# Patient Record
Sex: MALE | Race: WHITE | NOT HISPANIC OR LATINO | URBAN - METROPOLITAN AREA
[De-identification: names, ages, dates, MRNs, and addresses within clinical notes are randomized per-mention and may not be internally consistent; named-entity substitution may affect disease eponyms.]

---

## 2021-10-05 ENCOUNTER — OFFICE VISIT (OUTPATIENT)
Dept: URBAN - METROPOLITAN AREA CLINIC 74 | Facility: CLINIC | Age: 61
End: 2021-10-05
Payer: COMMERCIAL

## 2021-10-05 ENCOUNTER — WEB ENCOUNTER (OUTPATIENT)
Dept: URBAN - METROPOLITAN AREA CLINIC 74 | Facility: CLINIC | Age: 61
End: 2021-10-05

## 2021-10-05 DIAGNOSIS — K76.0 FATTY LIVER: ICD-10-CM

## 2021-10-05 DIAGNOSIS — K80.50 BILIARY COLIC: ICD-10-CM

## 2021-10-05 DIAGNOSIS — Z12.11 COLON CANCER SCREENING: ICD-10-CM

## 2021-10-05 DIAGNOSIS — F10.10 ALCOHOL ABUSE: ICD-10-CM

## 2021-10-05 DIAGNOSIS — R94.5 ABNORMAL LFTS: ICD-10-CM

## 2021-10-05 PROBLEM — 305058001: Status: ACTIVE | Noted: 2021-10-05

## 2021-10-05 PROBLEM — 37389005: Status: ACTIVE | Noted: 2021-10-05

## 2021-10-05 PROCEDURE — 99204 OFFICE O/P NEW MOD 45 MIN: CPT | Performed by: STUDENT IN AN ORGANIZED HEALTH CARE EDUCATION/TRAINING PROGRAM

## 2021-10-05 RX ORDER — EZETIMIBE 10 MG
1 TABLET TABLET ORAL ONCE A DAY
Status: ACTIVE | COMMUNITY

## 2021-10-05 RX ORDER — OMEPRAZOLE 40 MG/1
TAKE 1 CAPSULE (40 MG) BY ORAL ROUTE ONCE DAILY BEFORE A MEAL FOR 30 DAYS CAPSULE, DELAYED RELEASE PELLETS ORAL 1
Qty: 30 | Refills: 2 | Status: DISCONTINUED | COMMUNITY
Start: 2017-11-30

## 2021-10-05 NOTE — HPI-TODAY'S VISIT:
60-year-old male New to me Comes in for evaluation of intermittent episodes of epigastric and right upper quadrant pain. Episodic, moderate to severe in intensity, radiates to back around the site, mostly happens after meal intake.  At one of the episode, patient also reports darker urine and follicular stools for 1 day which then gradually resolved. Currently denies any pain.  No nausea or vomiting.  Does reports reflux but very rare episodes which respond very well to over-the-counter antacids. No dysphagia or odynophagia. Also reports history of abnormal liver function test which seems to be fluctuant for a long period of time.  Reports history of alcohol use which has been an issue although he has been trying to quit but not successfully so far. Has been told that he has fatty liver on his ultrasound. His liver function test improves when he has been abstinent from alcohol but then again worsens.  Last LFT was notable for isolated elevation in AST of upto 40s. Rest were within normal.

## 2021-10-11 ENCOUNTER — TELEPHONE ENCOUNTER (OUTPATIENT)
Dept: URBAN - METROPOLITAN AREA CLINIC 23 | Facility: CLINIC | Age: 61
End: 2021-10-11

## 2021-10-11 LAB
ANTI-CENTROMERE B ANTIBODIES: <0.2
ANTI-DNA (DS) AB QN: 1
ANTI-JO-1: <0.2
ANTI-MITOCHONDRIAL AB BY IFA: (no result)
ANTICHROMATIN ANTIBODIES: <0.2
ANTISCLERODERMA-70 ANTIBODIES: <0.2
FERRITIN, SERUM: 465
HBSAG SCREEN: NEGATIVE
HCV AB: 0.2
HEP B CORE AB, IGM: NEGATIVE
HEP B CORE AB, TOT: NEGATIVE
HEP B SURFACE AB, QUAL: NON REACTIVE
HEP BE AB: NEGATIVE
HEP BE AG: NEGATIVE
IGG, IMMUNOGLOBULIN G (RDL): 644
INTERPRETATION:: (no result)
Lab: (no result)
RNP ANTIBODIES: 0.3
SJOGREN'S ANTI-SS-A: <0.2
SJOGREN'S ANTI-SS-B: <0.2
SMITH ANTIBODIES: <0.2

## 2021-11-24 ENCOUNTER — OFFICE VISIT (OUTPATIENT)
Dept: URBAN - METROPOLITAN AREA CLINIC 74 | Facility: CLINIC | Age: 61
End: 2021-11-24

## 2021-11-24 RX ORDER — EZETIMIBE 10 MG
1 TABLET TABLET ORAL ONCE A DAY
Status: ACTIVE | COMMUNITY

## 2022-03-28 ENCOUNTER — OFFICE VISIT (OUTPATIENT)
Dept: URBAN - METROPOLITAN AREA CLINIC 74 | Facility: CLINIC | Age: 62
End: 2022-03-28
Payer: COMMERCIAL

## 2022-03-28 DIAGNOSIS — K62.5 BLOOD PER RECTUM: ICD-10-CM

## 2022-03-28 DIAGNOSIS — R14.0 BLOATING: ICD-10-CM

## 2022-03-28 DIAGNOSIS — K21.9 GASTROESOPHAGEAL REFLUX DISEASE, UNSPECIFIED WHETHER ESOPHAGITIS PRESENT: ICD-10-CM

## 2022-03-28 DIAGNOSIS — R13.19 ESOPHAGEAL DYSPHAGIA: ICD-10-CM

## 2022-03-28 PROBLEM — 40890009: Status: ACTIVE | Noted: 2022-03-28

## 2022-03-28 PROBLEM — 235595009: Status: ACTIVE | Noted: 2022-03-28

## 2022-03-28 PROCEDURE — 99214 OFFICE O/P EST MOD 30 MIN: CPT | Performed by: STUDENT IN AN ORGANIZED HEALTH CARE EDUCATION/TRAINING PROGRAM

## 2022-03-28 RX ORDER — OMEPRAZOLE 20 MG/1
1 CAPSULE 30 MINUTES BEFORE MORNING MEAL CAPSULE, DELAYED RELEASE ORAL ONCE A DAY
Qty: 30 | Refills: 2 | OUTPATIENT
Start: 2022-03-28

## 2022-03-28 RX ORDER — EZETIMIBE 10 MG
1 TABLET TABLET ORAL ONCE A DAY
Status: ACTIVE | COMMUNITY

## 2022-03-28 NOTE — HPI-TODAY'S VISIT:
61-year-old male Established with me in the past Comes in for multiple complaints Patient stated that he underwent cholecystectomy and since then his abdomen pain has resolved. Patient has also stopped drinking alcohol. Patient states that he has a longstanding history of reflux for which he takes antacids on as-needed basis and also has been intermittently feeling difficulty in swallowing.  This happens sometimes with liquids sometimes with solids but not consistently.  He says that this has been happening for many many years and has not progressed. Patient also reports significant bloating.  Does reports regular bowel movements.  No significant constipation or diarrhea.  Does reports intermittent blood per rectum whenever he strains which is not very common.  The blood was small in amount, red in color, mostly on Adali and separate from stool. Last colonoscopy about 8 years back.  Recommendation was to repeat in 10-year interval for screening.

## 2022-04-01 ENCOUNTER — TELEPHONE ENCOUNTER (OUTPATIENT)
Dept: URBAN - METROPOLITAN AREA CLINIC 74 | Facility: CLINIC | Age: 62
End: 2022-04-01

## 2022-06-20 ENCOUNTER — OFFICE VISIT (OUTPATIENT)
Dept: URBAN - METROPOLITAN AREA CLINIC 80 | Facility: CLINIC | Age: 62
End: 2022-06-20
Payer: COMMERCIAL

## 2022-06-20 ENCOUNTER — DASHBOARD ENCOUNTERS (OUTPATIENT)
Age: 62
End: 2022-06-20

## 2022-06-20 VITALS
TEMPERATURE: 98.1 F | DIASTOLIC BLOOD PRESSURE: 80 MMHG | HEIGHT: 69 IN | HEART RATE: 87 BPM | WEIGHT: 258 LBS | BODY MASS INDEX: 38.21 KG/M2 | SYSTOLIC BLOOD PRESSURE: 150 MMHG

## 2022-06-20 DIAGNOSIS — E66.9 OBESITY, UNSPECIFIED: ICD-10-CM

## 2022-06-20 DIAGNOSIS — K76.0 FATTY LIVER: ICD-10-CM

## 2022-06-20 DIAGNOSIS — Z12.11 SCREENING FOR COLON CANCER: ICD-10-CM

## 2022-06-20 DIAGNOSIS — F10.10 ALCOHOL ABUSE: ICD-10-CM

## 2022-06-20 DIAGNOSIS — Z90.49 HISTORY OF CHOLECYSTECTOMY: ICD-10-CM

## 2022-06-20 PROBLEM — 414916001: Status: ACTIVE | Noted: 2022-06-20

## 2022-06-20 PROBLEM — 428882003: Status: ACTIVE | Noted: 2022-06-20

## 2022-06-20 PROBLEM — 15167005: Status: ACTIVE | Noted: 2021-10-05

## 2022-06-20 PROBLEM — 162864005: Status: ACTIVE | Noted: 2022-06-20

## 2022-06-20 PROBLEM — 197321007: Status: ACTIVE | Noted: 2021-10-05

## 2022-06-20 PROCEDURE — 99214 OFFICE O/P EST MOD 30 MIN: CPT | Performed by: INTERNAL MEDICINE

## 2022-06-20 RX ORDER — NEBIVOLOL HYDROCHLORIDE 2.5 MG/1
1 TABLET TABLET ORAL ONCE A DAY
Refills: 0 | Status: ACTIVE | COMMUNITY
Start: 1900-01-01

## 2022-06-20 RX ORDER — MULTIVITAMIN
1 TABLET TABLET ORAL ONCE A DAY
Status: ACTIVE | COMMUNITY

## 2022-06-20 RX ORDER — FAMOTIDINE 10 MG/1
1 TABLET AS NEEDED TABLET, FILM COATED ORAL TWICE A DAY
Status: ACTIVE | COMMUNITY

## 2022-06-20 RX ORDER — OXYCODONE HYDROCHLORIDE AND ACETAMINOPHEN 500 MG
1 TABLET TABLET ORAL ONCE A DAY
Status: ACTIVE | COMMUNITY

## 2022-06-20 RX ORDER — ROSUVASTATIN CALCIUM 40 MG/1
1 TABLET TABLET, FILM COATED ORAL ONCE A DAY
Refills: 0 | Status: ACTIVE | COMMUNITY
Start: 1900-01-01

## 2022-06-20 RX ORDER — EZETIMIBE 10 MG
1 TABLET TABLET ORAL ONCE A DAY
Status: ACTIVE | COMMUNITY

## 2022-06-20 RX ORDER — ASPIRIN 81 MG/1
1 TABLET TABLET, COATED ORAL ONCE A DAY
Refills: 0 | Status: ACTIVE | COMMUNITY
Start: 1900-01-01

## 2022-06-20 RX ORDER — OMEPRAZOLE 20 MG/1
1 CAPSULE 30 MINUTES BEFORE MORNING MEAL CAPSULE, DELAYED RELEASE ORAL ONCE A DAY
Qty: 30 | Refills: 2 | Status: ON HOLD | COMMUNITY
Start: 2022-03-28

## 2022-06-20 NOTE — HPI-TODAY'S VISIT:
August 2021 hospitalized in ICU for COVID pneumonia some abd pain Dx GB problems removed several months ago, also found to have fatty liver  Went to see Dr Yuan in f/u  Struggling w  EtOH- 4-5 drinks daily, not every day but in the setting of boredom and habit  Has started keto with intertmittant fasting Also drinking water/lemon  Stress/bored. Retired several years ago can't figure it out.